# Patient Record
Sex: MALE | Race: ASIAN | NOT HISPANIC OR LATINO | URBAN - METROPOLITAN AREA
[De-identification: names, ages, dates, MRNs, and addresses within clinical notes are randomized per-mention and may not be internally consistent; named-entity substitution may affect disease eponyms.]

---

## 2017-10-03 ENCOUNTER — OFFICE VISIT - HEALTHEAST (OUTPATIENT)
Dept: FAMILY MEDICINE | Facility: CLINIC | Age: 52
End: 2017-10-03

## 2017-10-03 ENCOUNTER — COMMUNICATION - HEALTHEAST (OUTPATIENT)
Dept: TELEHEALTH | Facility: CLINIC | Age: 52
End: 2017-10-03

## 2017-10-03 ENCOUNTER — COMMUNICATION - HEALTHEAST (OUTPATIENT)
Dept: FAMILY MEDICINE | Facility: CLINIC | Age: 52
End: 2017-10-03

## 2017-10-03 DIAGNOSIS — K12.0 APHTHOUS ULCER OF MOUTH: ICD-10-CM

## 2017-10-03 ASSESSMENT — MIFFLIN-ST. JEOR: SCORE: 1358.46

## 2021-05-31 VITALS — WEIGHT: 137 LBS | BODY MASS INDEX: 23.39 KG/M2 | HEIGHT: 64 IN

## 2021-06-13 NOTE — PROGRESS NOTES
"Chief Complaint   Patient presents with     sores in mouth since last week     Subjective:  52 y.o. male with concerns of mouth sores.  Present for 6 days.  Has fever.  Aleve helps pain and fever.  Foot hurts mouth sores.  Swallows normally.  No nausea or vomiting.  Has cough, rhinorrhea, sore throat.  No ear pain.  Never had this condition before.    He has no concerns of HIV infection and denies having risk factors for it.     History   Smoking Status     Current Every Day Smoker   Smokeless Tobacco     Not on file     Comment: 2 cigarettes per day      PMH: none  PSH: none  MEDS: only Aleve.  Allergies: none    Objective:  /78 (Patient Site: Right Arm, Patient Position: Sitting, Cuff Size: Adult Regular)  Pulse 80  Temp 98.6  F (37  C) (Oral)   Ht 5' 3.75\" (1.619 m)  Wt 137 lb (62.1 kg)  BMI 23.7 kg/m2  GENERAL: alert, not distressed  EARS: normal tympanic membranes and external auditory canals bilaterally  PHARYNX: no erythema or exudates  MOUTH: well hydrated mucosa   Has multiple shallow ulcers consistent with aphthae  NECK: no lymphadenopathy or thyroid nodules   CHEST: clear, no rales, rhonchi, or wheezes  CARDIAC: regular without murmur    Assessment and Plan:   1. Aphthous ulcer of mouth  Seems infectious with fever.  Related that this is likely viral.  Symptomatic treatments with topicals and continued NSAIDs as he has.  He wanted something to help address infection.  Discussed limited evidence for antivirals.  He wanted to try acyclovir despite.  Follow up next week if not better.    - alum/mag hydrox-simethicone-diphenhydramine-lidocaine (MAGIC MOUTHWASH) suspension; Swish and spit 15 mL 4 (four) times a day.  Dispense: 240 mL; Refill: 0  - acyclovir (ZOVIRAX) 400 MG tablet; Take 1 tablet (400 mg total) by mouth 3 (three) times a day for 7 days.  Dispense: 21 tablet; Refill: 0   "

## 2021-06-16 PROBLEM — K12.0 APHTHOUS ULCER OF MOUTH: Status: ACTIVE | Noted: 2017-10-03

## 2025-09-04 ENCOUNTER — OFFICE VISIT (OUTPATIENT)
Dept: FAMILY MEDICINE | Facility: CLINIC | Age: 60
End: 2025-09-04
Payer: COMMERCIAL

## 2025-09-04 VITALS
OXYGEN SATURATION: 100 % | HEIGHT: 64 IN | BODY MASS INDEX: 21.08 KG/M2 | RESPIRATION RATE: 24 BRPM | WEIGHT: 123.46 LBS | HEART RATE: 78 BPM | SYSTOLIC BLOOD PRESSURE: 110 MMHG | TEMPERATURE: 98.1 F | DIASTOLIC BLOOD PRESSURE: 60 MMHG

## 2025-09-04 DIAGNOSIS — R19.8 PERFORATED VISCUS: ICD-10-CM

## 2025-09-04 DIAGNOSIS — Z09 HOSPITAL DISCHARGE FOLLOW-UP: Primary | ICD-10-CM

## 2025-09-04 DIAGNOSIS — D64.9 LOW HEMOGLOBIN: ICD-10-CM

## 2025-09-04 DIAGNOSIS — B96.81 DUODENAL ULCER DUE TO HELICOBACTER PYLORI: ICD-10-CM

## 2025-09-04 DIAGNOSIS — K26.9 DUODENAL ULCER DUE TO HELICOBACTER PYLORI: ICD-10-CM

## 2025-09-04 LAB
ALBUMIN SERPL BCG-MCNC: 3.8 G/DL (ref 3.5–5.2)
ALP SERPL-CCNC: 54 U/L (ref 40–150)
ALT SERPL W P-5'-P-CCNC: 14 U/L (ref 0–70)
ANION GAP SERPL CALCULATED.3IONS-SCNC: 10 MMOL/L (ref 7–15)
AST SERPL W P-5'-P-CCNC: 36 U/L (ref 0–45)
BILIRUB SERPL-MCNC: 0.3 MG/DL
BUN SERPL-MCNC: 9.7 MG/DL (ref 8–23)
CALCIUM SERPL-MCNC: 9 MG/DL (ref 8.8–10.4)
CHLORIDE SERPL-SCNC: 99 MMOL/L (ref 98–107)
CREAT SERPL-MCNC: 0.61 MG/DL (ref 0.67–1.17)
EGFRCR SERPLBLD CKD-EPI 2021: >90 ML/MIN/1.73M2
ERYTHROCYTE [DISTWIDTH] IN BLOOD BY AUTOMATED COUNT: 27.7 % (ref 10–15)
GLUCOSE SERPL-MCNC: 171 MG/DL (ref 70–99)
HCO3 SERPL-SCNC: 20 MMOL/L (ref 22–29)
HCT VFR BLD AUTO: 31.8 % (ref 40–53)
HGB BLD-MCNC: 9.2 G/DL (ref 13.3–17.7)
IRON BINDING CAPACITY (ROCHE): 313 UG/DL (ref 240–430)
IRON SATN MFR SERPL: 7 % (ref 15–46)
IRON SERPL-MCNC: 21 UG/DL (ref 61–157)
MCH RBC QN AUTO: 20.7 PG (ref 26.5–33)
MCHC RBC AUTO-ENTMCNC: 28.9 G/DL (ref 31.5–36.5)
MCV RBC AUTO: 71.5 FL (ref 78–100)
PLATELET # BLD AUTO: 638 10E3/UL (ref 150–450)
POTASSIUM SERPL-SCNC: 4.2 MMOL/L (ref 3.4–5.3)
PROT SERPL-MCNC: 7 G/DL (ref 6.4–8.3)
RBC # BLD AUTO: 4.45 10E6/UL (ref 4.4–5.9)
SODIUM SERPL-SCNC: 129 MMOL/L (ref 135–145)
VIT B12 SERPL-MCNC: 947 PG/ML (ref 232–1245)
WBC # BLD AUTO: 5.52 10E3/UL (ref 4–11)

## 2025-09-04 RX ORDER — METRONIDAZOLE 500 MG/1
500 TABLET ORAL 3 TIMES DAILY
COMMUNITY
Start: 2025-08-29 | End: 2025-09-04

## 2025-09-04 RX ORDER — DOXYCYCLINE 100 MG/1
100 CAPSULE ORAL 2 TIMES DAILY
COMMUNITY
Start: 2025-08-27

## 2025-09-04 RX ORDER — OMEPRAZOLE 20 MG/1
20 CAPSULE, DELAYED RELEASE ORAL DAILY
COMMUNITY
Start: 2025-08-27

## 2025-09-04 RX ORDER — ACETAMINOPHEN 500 MG
1000 TABLET ORAL EVERY 4 HOURS PRN
COMMUNITY
Start: 2025-08-28

## 2025-09-04 RX ORDER — DOXYCYCLINE 100 MG/1
100 CAPSULE ORAL DAILY
COMMUNITY
Start: 2025-08-29 | End: 2025-09-04

## 2025-09-04 RX ORDER — METRONIDAZOLE 500 MG/1
500 TABLET ORAL 4 TIMES DAILY
COMMUNITY
Start: 2025-08-27